# Patient Record
Sex: FEMALE | Race: WHITE
[De-identification: names, ages, dates, MRNs, and addresses within clinical notes are randomized per-mention and may not be internally consistent; named-entity substitution may affect disease eponyms.]

---

## 2018-02-25 ENCOUNTER — HOSPITAL ENCOUNTER (EMERGENCY)
Dept: HOSPITAL 31 - C.ER | Age: 68
Discharge: HOME | End: 2018-02-25
Payer: SELF-PAY

## 2018-02-25 VITALS — OXYGEN SATURATION: 99 %

## 2018-02-25 VITALS
RESPIRATION RATE: 16 BRPM | DIASTOLIC BLOOD PRESSURE: 87 MMHG | TEMPERATURE: 98.2 F | SYSTOLIC BLOOD PRESSURE: 160 MMHG | HEART RATE: 69 BPM

## 2018-02-25 DIAGNOSIS — R06.6: ICD-10-CM

## 2018-02-25 DIAGNOSIS — H26.9: ICD-10-CM

## 2018-02-25 DIAGNOSIS — L72.3: Primary | ICD-10-CM

## 2018-02-25 NOTE — C.PDOC
History Of Present Illness


67-year-old female, presents to the emergency department with multiple 

complaints. Patient states she has had bumps on her head for several years that 

are "getting more annoying" and she wants to get them "checked out." Patient 

also notes blurred vision over the past several years and describes it as 

"cloudy." Patients third complaint are hiccups. States when she eats too fast 

sometimes, she feels like the food gets stuck and she experiences hiccups.


Time Seen by Provider: 02/25/18 13:56


Chief Complaint (Nursing): Abnormal Skin Integrity


History Per: Patient


History/Exam Limitations: no limitations





Past Medical History


Reviewed: Historical Data, Nursing Documentation, Vital Signs


Vital Signs: 


 Last Vital Signs











Temp  98.2 F   02/25/18 15:30


 


Pulse  69   02/25/18 15:30


 


Resp  16   02/25/18 15:30


 


BP  160/87 H  02/25/18 15:30


 


Pulse Ox  99   02/25/18 20:22











Family History: States: No Known Family Hx





- Social History


Hx Alcohol Use: No


Hx Substance Use: No





- Immunization History


Hx Tetanus Toxoid Vaccination: No


Hx Influenza Vaccination: No


Hx Pneumococcal Vaccination: No





Review Of Systems


Constitutional: Negative for: Fever


Cardiovascular: Negative for: Chest Pain


Respiratory: Negative for: Cough, Shortness of Breath


Gastrointestinal: Negative for: Vomiting


Musculoskeletal: Negative for: Back Pain


Neurological: Negative for: Weakness, Numbness, Headache, Dizziness





Physical Exam





- Physical Exam


Appears: Non-toxic, No Acute Distress


Skin: Normal Color, Warm, Dry


Head: Normacephalic, Other (scattered nodules on scalp)


Neck: Normal ROM


Cardiovascular: Rhythm Regular, No Murmur


Respiratory: Normal Breath Sounds, No Accessory Muscle Use


Extremity: Normal ROM


Neurological/Psych: Oriented x3, Normal Speech





ED Course And Treatment


O2 Sat by Pulse Oximetry: 99


Progress Note: Patient with chronic complaints. She will be discharged for 

outpatient f/u with PMD/clinic.





Disposition





- Disposition


Referrals: 


[facility] at [facility] [Outside]


Santos Crowell MD [Staff Provider] - 


Jr Tucker MD [Staff Provider] - 


Kartik Zimmerman MD [Staff Provider] - 


Disposition: HOME/ ROUTINE


Disposition Time: 14:52


Condition: GOOD


Additional Instructions: 


Follow up with the medical doctor with 1-2 days. return if worsened. 


Prescriptions: 


Famotidine [Pepcid] 20 mg PO BID #20 tab


Instructions:  Hiccups, Cataracts


Forms:  [employer] (English)





- Clinical Impression


Clinical Impression: 


 Hiccups, Cataract, Sebaceous cyst








- Scribe Statement


The provider has reviewed the documentation as recorded by the Scribe (Sreekanth Braxton)








All medical record entries made by the Scribe were at my direction and 

personally dictated by me. I have reviewed the chart and agree that the record 

accurately reflects my personal performance of the history, physical exam, 

medical decision making, and the department course for this patient. I have 

also personally directed, reviewed, and agree with the discharge instructions 

and disposition.